# Patient Record
Sex: MALE | Race: WHITE | NOT HISPANIC OR LATINO | Employment: UNEMPLOYED | URBAN - METROPOLITAN AREA
[De-identification: names, ages, dates, MRNs, and addresses within clinical notes are randomized per-mention and may not be internally consistent; named-entity substitution may affect disease eponyms.]

---

## 2024-05-24 ENCOUNTER — OFFICE VISIT (OUTPATIENT)
Dept: LAB | Facility: HOSPITAL | Age: 39
End: 2024-05-24
Payer: COMMERCIAL

## 2024-05-24 ENCOUNTER — APPOINTMENT (OUTPATIENT)
Dept: LAB | Age: 39
End: 2024-05-24
Payer: COMMERCIAL

## 2024-05-24 DIAGNOSIS — R07.9 CHEST PAIN, UNSPECIFIED TYPE: ICD-10-CM

## 2024-05-24 DIAGNOSIS — Z00.00 ROUTINE ADULT HEALTH MAINTENANCE: ICD-10-CM

## 2024-05-24 LAB
25(OH)D3 SERPL-MCNC: 15.3 NG/ML (ref 30–100)
ALBUMIN SERPL BCP-MCNC: 4.2 G/DL (ref 3.5–5)
ALP SERPL-CCNC: 54 U/L (ref 34–104)
ALT SERPL W P-5'-P-CCNC: 14 U/L (ref 7–52)
ANION GAP SERPL CALCULATED.3IONS-SCNC: 9 MMOL/L (ref 4–13)
AST SERPL W P-5'-P-CCNC: 14 U/L (ref 13–39)
ATRIAL RATE: 59 BPM
BASOPHILS # BLD AUTO: 0.04 THOUSANDS/ÂΜL (ref 0–0.1)
BASOPHILS NFR BLD AUTO: 1 % (ref 0–1)
BILIRUB SERPL-MCNC: 1.21 MG/DL (ref 0.2–1)
BUN SERPL-MCNC: 14 MG/DL (ref 5–25)
CALCIUM SERPL-MCNC: 9.3 MG/DL (ref 8.4–10.2)
CHLORIDE SERPL-SCNC: 107 MMOL/L (ref 96–108)
CHOLEST SERPL-MCNC: 189 MG/DL
CO2 SERPL-SCNC: 26 MMOL/L (ref 21–32)
CREAT SERPL-MCNC: 0.78 MG/DL (ref 0.6–1.3)
EOSINOPHIL # BLD AUTO: 0.27 THOUSAND/ÂΜL (ref 0–0.61)
EOSINOPHIL NFR BLD AUTO: 3 % (ref 0–6)
ERYTHROCYTE [DISTWIDTH] IN BLOOD BY AUTOMATED COUNT: 13.5 % (ref 11.6–15.1)
GFR SERPL CREATININE-BSD FRML MDRD: 115 ML/MIN/1.73SQ M
GLUCOSE P FAST SERPL-MCNC: 92 MG/DL (ref 65–99)
HCT VFR BLD AUTO: 50.1 % (ref 36.5–49.3)
HDLC SERPL-MCNC: 40 MG/DL
HGB BLD-MCNC: 15.5 G/DL (ref 12–17)
IMM GRANULOCYTES # BLD AUTO: 0.03 THOUSAND/UL (ref 0–0.2)
IMM GRANULOCYTES NFR BLD AUTO: 0 % (ref 0–2)
LDLC SERPL CALC-MCNC: 129 MG/DL (ref 0–100)
LYMPHOCYTES # BLD AUTO: 4.08 THOUSANDS/ÂΜL (ref 0.6–4.47)
LYMPHOCYTES NFR BLD AUTO: 51 % (ref 14–44)
MCH RBC QN AUTO: 26.6 PG (ref 26.8–34.3)
MCHC RBC AUTO-ENTMCNC: 30.9 G/DL (ref 31.4–37.4)
MCV RBC AUTO: 86 FL (ref 82–98)
MONOCYTES # BLD AUTO: 0.49 THOUSAND/ÂΜL (ref 0.17–1.22)
MONOCYTES NFR BLD AUTO: 6 % (ref 4–12)
NEUTROPHILS # BLD AUTO: 3.13 THOUSANDS/ÂΜL (ref 1.85–7.62)
NEUTS SEG NFR BLD AUTO: 39 % (ref 43–75)
NONHDLC SERPL-MCNC: 149 MG/DL
NRBC BLD AUTO-RTO: 0 /100 WBCS
P AXIS: 27 DEGREES
PLATELET # BLD AUTO: 316 THOUSANDS/UL (ref 149–390)
PMV BLD AUTO: 10.5 FL (ref 8.9–12.7)
POTASSIUM SERPL-SCNC: 4.7 MMOL/L (ref 3.5–5.3)
PR INTERVAL: 134 MS
PROT SERPL-MCNC: 7.1 G/DL (ref 6.4–8.4)
QRS AXIS: 47 DEGREES
QRSD INTERVAL: 90 MS
QT INTERVAL: 378 MS
QTC INTERVAL: 374 MS
RBC # BLD AUTO: 5.82 MILLION/UL (ref 3.88–5.62)
SODIUM SERPL-SCNC: 142 MMOL/L (ref 135–147)
T WAVE AXIS: 39 DEGREES
TRIGL SERPL-MCNC: 102 MG/DL
VENTRICULAR RATE: 59 BPM
WBC # BLD AUTO: 8.04 THOUSAND/UL (ref 4.31–10.16)

## 2024-05-24 PROCEDURE — 80061 LIPID PANEL: CPT

## 2024-05-24 PROCEDURE — 93005 ELECTROCARDIOGRAM TRACING: CPT

## 2024-05-24 PROCEDURE — 82306 VITAMIN D 25 HYDROXY: CPT

## 2024-05-24 PROCEDURE — 85025 COMPLETE CBC W/AUTO DIFF WBC: CPT

## 2024-05-24 PROCEDURE — 36415 COLL VENOUS BLD VENIPUNCTURE: CPT

## 2024-05-24 PROCEDURE — 80053 COMPREHEN METABOLIC PANEL: CPT

## 2024-05-24 PROCEDURE — 93010 ELECTROCARDIOGRAM REPORT: CPT | Performed by: INTERNAL MEDICINE

## 2024-10-07 ENCOUNTER — HOSPITAL ENCOUNTER (EMERGENCY)
Facility: HOSPITAL | Age: 39
Discharge: HOME | End: 2024-10-07
Attending: EMERGENCY MEDICINE | Admitting: EMERGENCY MEDICINE
Payer: COMMERCIAL

## 2024-10-07 ENCOUNTER — APPOINTMENT (EMERGENCY)
Dept: RADIOLOGY | Facility: HOSPITAL | Age: 39
End: 2024-10-07
Payer: COMMERCIAL

## 2024-10-07 VITALS
DIASTOLIC BLOOD PRESSURE: 74 MMHG | OXYGEN SATURATION: 96 % | SYSTOLIC BLOOD PRESSURE: 118 MMHG | RESPIRATION RATE: 18 BRPM | TEMPERATURE: 97.9 F | HEART RATE: 61 BPM

## 2024-10-07 DIAGNOSIS — R07.89 ATYPICAL CHEST PAIN: Primary | ICD-10-CM

## 2024-10-07 DIAGNOSIS — Z72.0 TOBACCO ABUSE: ICD-10-CM

## 2024-10-07 PROCEDURE — 99285 EMERGENCY DEPT VISIT HI MDM: CPT

## 2024-10-07 PROCEDURE — 93005 ELECTROCARDIOGRAM TRACING: CPT

## 2024-10-07 PROCEDURE — 71045 X-RAY EXAM CHEST 1 VIEW: CPT

## 2024-10-07 PROCEDURE — 99284 EMERGENCY DEPT VISIT MOD MDM: CPT | Performed by: EMERGENCY MEDICINE

## 2024-10-07 NOTE — ED PROVIDER NOTES
Final diagnoses:   Atypical chest pain   Tobacco abuse     ED Disposition       ED Disposition   Discharge    Condition   Stable    Date/Time   Mon Oct 7, 2024  7:37 PM    Comment   Harjit Lemus discharge to home/self care.                   Assessment & Plan       Medical Decision Making  Amount and/or Complexity of Data Reviewed  Radiology: ordered and independent interpretation performed.      CHEST PAIN MDM  Presentation with the above-described chest pain. Differential diagnosis includes but is not  limited to ACS, pneumonia, PE, pneumothorax, pulmonary edema/CHF, aortic dissection,  pericarditis, and intra-abdominal process.      ED course: [Stable]     MDM: Please see ED course for specifics,    EKG and history do not support the diagnosis of pericarditis, change from prior tracings.    There is no evidence of pneumothorax or infiltrate on CXR.    Aortic dissection was considered, but presenting symptoms felt uncharacteristic, chest X-ray  shows no evidence of mediastinal widening and there are strong, equal and symmetric pulses.  Given the current presentation, aortic dissection is felt unlikely.*      History Chest X-ray, and exam do not suggest pulmonary edema/congestive heart failure.     Pulmonary embolism was considered but felt unlikely due to the compendium of presenting  elements leading to a low pre-test probability followed by a negative PERC rule. All 8 of the  rule-out PERC criteria were present including Age<50, HR <100, O2 sat > 94%, no recent  trauma/surgery, no hemoptysis, no exogenous hormone use, no clinical signs suggestive of  DVT, no hx DVT/PE. Given the above, there is a <2% risk of PE and I feel that no further  diagnostic testing is needed.**    Intra-abdominal pathology felt unlikely given benign/nontender abdominal exam.  Acute coronary syndrome is considered, no acute  ischemic EKG changes, and the patient has a low HEART score.     Based on this, I think that there is a low risk  for short-term major adverse cardiac event. I have discussed this with the patient and reviewed options for inpatient and outpatient management. The patient verbalizes  an excellent understanding of the above including the presence of a small risk of short-term  major adverse cardiac event even in the setting of low HEART score, and compendium of elements of this presentation. The patient wishes to pursue  further workup as an outpatient.    ED Course as of 10/07/24 2015   Mon Oct 07, 2024   1926 Patient seen evaluated, orders placed, physically examined, chest pain right sided nonradiating intermittent in nature not related to exertion no ACS like symptoms.  Pain is reproducible, currently pain-free.    Brief focused differential dx in this patient is as follows: Atypical chest pain versus musculoskeletal chest pain versus.       Medications - No data to display    ED Risk Strat Scores                       PERC Rule for PE      Flowsheet Row Most Recent Value   PERC Rule for PE    Age >=50 0 Filed at: 10/07/2024 2015   HR >=100 0 Filed at: 10/07/2024 2015   O2 Sat on room air < 95% 0 Filed at: 10/07/2024 2015   History of PE or DVT 0 Filed at: 10/07/2024 2015   Recent trauma or surgery 0 Filed at: 10/07/2024 2015   Hemoptysis 0 Filed at: 10/07/2024 2015   Exogenous estrogen 0 Filed at: 10/07/2024 2015   Unilateral leg swelling 0 Filed at: 10/07/2024 2015   PERC Rule for PE Results 0 Filed at: 10/07/2024 2015                Wells' Criteria for PE      Flowsheet Row Most Recent Value   Wells' Criteria for PE    Clinical signs and symptoms of DVT 0 Filed at: 10/07/2024 2015   PE is primary diagnosis or equally likely 0 Filed at: 10/07/2024 2015   HR >100 0 Filed at: 10/07/2024 2015   Immobilization at least 3 days or Surgery in the previous 4 weeks 0 Filed at: 10/07/2024 2015   Previous, objectively diagnosed PE or DVT 0 Filed at: 10/07/2024 2015   Hemoptysis 0 Filed at: 10/07/2024 2015   Malignancy with treatment  within 6 months or palliative 0 Filed at: 10/07/2024 2015   Wells' Criteria Total 0 Filed at: 10/07/2024 2015          Quinn' Criteria for DVT      Flowsheet Row Most Recent Value   Quinn' Criteria for DVT    Active cancer Treatment or palliation within 6 months 0 Filed at: 10/07/2024 2015   Bedridden recently >3 days or major surgery within 12 weeks 0 Filed at: 10/07/2024 2015   Calf swelling >3 cm compared to the other leg 0 Filed at: 10/07/2024 2015   Entire leg swollen 0 Filed at: 10/07/2024 2015   Collateral (nonvaricose) superficial veins present 0 Filed at: 10/07/2024 2015   Localized tenderness along the deep venous system 0 Filed at: 10/07/2024 2015   Pitting edema, confined to symptomatic leg 0 Filed at: 10/07/2024 2015   Paralysis, paresis, or recent plaster immobilization of the lower extremity 0 Filed at: 10/07/2024 2015   Previously documented DVT 0 Filed at: 10/07/2024 2015   Alternative diagnosis to DVT as likely or more likely 0 Filed at: 10/07/2024 2015   Wells DVT Critera Score 0 Filed at: 10/07/2024 2015                      History of Present Illness       Chief Complaint   Patient presents with    Chest Pain     Pt states that he would like a head to toe evaluation      Shortness of Breath       History reviewed. No pertinent past medical history.   History reviewed. No pertinent surgical history.   History reviewed. No pertinent family history.   Social History     Tobacco Use    Smoking status: Every Day     Current packs/day: 1.00     Types: Cigarettes    Smokeless tobacco: Current   Vaping Use    Vaping status: Never Used   Substance Use Topics    Alcohol use: No    Drug use: No      E-Cigarette/Vaping    E-Cigarette Use Never User       E-Cigarette/Vaping Substances    Nicotine No     THC No     CBD No     Flavoring No     Other No     Unknown No       I have reviewed and agree with the history as documented.     HPI      39-year-old gentleman presents emergency department with chief  complaint of intermittent nonradiating right-sided chest pain without associated nausea, diaphoresis, shortness of breath, exertional symptoms.  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  Does smoke cigarettes but does not consume alcohol on a regular basis no history of illicit drug use.    Abdominal pain, no nausea, no vomiting, no diaphoresis, no presyncopal symptoms, no jaw pain, no upper back pain no calf pain no recent travel outside the geographical area.  No history of hemoptysis, melena, hematochezia, remaining remaining 12 point review of systems is unremarkable.  Review of Systems   Constitutional: Negative.    HENT: Negative.     Eyes: Negative.    Respiratory:  Negative for chest tightness and shortness of breath.    Cardiovascular:  Positive for chest pain. Negative for palpitations and leg swelling.   Gastrointestinal: Negative.  Negative for abdominal pain, diarrhea and vomiting.   Endocrine: Negative.    Genitourinary: Negative.    Musculoskeletal: Negative.    Skin: Negative.    Allergic/Immunologic: Negative.    Neurological: Negative.    Hematological: Negative.    Psychiatric/Behavioral: Negative.             Objective       ED Triage Vitals   Temperature Pulse Blood Pressure Respirations SpO2 Patient Position - Orthostatic VS   10/07/24 1915 10/07/24 1915 10/07/24 1915 10/07/24 1915 10/07/24 1915 10/07/24 2000   97.9 °F (36.6 °C) 62 120/59 19 98 % Sitting      Temp src Heart Rate Source BP Location FiO2 (%) Pain Score    -- 10/07/24 1915 10/07/24 2000 -- 10/07/24 1915     Monitor Left arm  3      Vitals      Date and Time Temp Pulse SpO2 Resp BP Pain Score FACES Pain Rating User   10/07/24 2000 -- 61 96 % 18 118/74 -- -- JR   10/07/24 1915 97.9 °F (36.6 °C) 62 98 % 19 120/59 3 -- AF            Physical Exam  Vitals and nursing note  reviewed.   Constitutional:       General: He is not in acute distress.     Appearance: He is well-developed and normal weight. He is not ill-appearing, toxic-appearing or diaphoretic.   HENT:      Head: Normocephalic and atraumatic.   Eyes:      Extraocular Movements: Extraocular movements intact.      Pupils: Pupils are equal, round, and reactive to light.   Cardiovascular:      Rate and Rhythm: Normal rate and regular rhythm.      Heart sounds: Normal heart sounds.   Pulmonary:      Effort: Pulmonary effort is normal. No tachypnea, accessory muscle usage or respiratory distress.      Breath sounds: Normal breath sounds. No stridor. No decreased breath sounds, wheezing, rhonchi or rales.   Abdominal:      General: Bowel sounds are normal. There is no abdominal bruit.      Palpations: There is no fluid wave.   Musculoskeletal:         General: Normal range of motion.      Cervical back: Normal range of motion and neck supple.      Right lower leg: No tenderness. No edema.      Left lower leg: No tenderness. No edema.   Skin:     General: Skin is warm.      Capillary Refill: Capillary refill takes less than 2 seconds.   Neurological:      General: No focal deficit present.      Mental Status: He is alert and oriented to person, place, and time.   Psychiatric:         Mood and Affect: Mood normal.         Behavior: Behavior normal.         Results Reviewed       None            XR chest 1 view portable   ED Interpretation by Thanh Hopkins III, DO (10/07 1937)   Portable chest x-ray shows no acute fractures, osseous abnormalities or occult pneumothoraces.          ECG 12 Lead Documentation Only    Date/Time: 10/7/2024 7:50 PM    Performed by: Thanh Hopkins III, DO  Authorized by: Thanh Hopkins III, DO    Indications / Diagnosis:  Chest pain  ECG reviewed by me, the ED Provider: yes    Patient location:  ED  Comments:      I personally reviewed this EKG that was performed with the patient  October 7, 2024, EKG was completed at 7:47 PM, interpreted by me at 7:50 PM, normal sinus rhythm with no acute ST abnormalities, ventricular rate of 65 bpm.  Unchanged from prior tracings as of May 24, 2024.    No diffuse elevations to indicate pericarditis.  No coved ST elevations greater than 2mm with negative T waves in V1-3 to indicate concern for brugada.  No biphasic T waves in V2, V3 to indicate Wellens (critical stenosis of LAD).   No elevation in aVR or deviation when compared to V1 (can be associated with ST depression in I,II, V4-6 when left main occlusion is present).       ED Medication and Procedure Management   Prior to Admission Medications   Prescriptions Last Dose Informant Patient Reported? Taking?   ergocalciferol (VITAMIN D2) 50,000 units   No No   Sig: Take 1 capsule (50,000 Units total) by mouth once a week   ibuprofen (MOTRIN) 400 mg tablet   No No   Sig: Take 1 tablet (400 mg total) by mouth 3 (three) times a day with meals for 10 days      Facility-Administered Medications: None     Patient's Medications   Discharge Prescriptions    No medications on file     No discharge procedures on file.  ED SEPSIS DOCUMENTATION   Time reflects when diagnosis was documented in both MDM as applicable and the Disposition within this note       Time User Action Codes Description Comment    10/7/2024  8:01 PM Thanh Hopkins [R07.89] Atypical chest pain     10/7/2024  8:15 PM Thanh Hopkins [Z72.0] Tobacco abuse                  Thanh Hopkins III, DO  10/07/24 2016

## 2024-10-07 NOTE — Clinical Note
Harjit Lemus was seen and treated in our emergency department on 10/7/2024.    No restrictions            Diagnosis:     Harjit  may return to work on return date.    He may return on this date: 10/09/2024         If you have any questions or concerns, please don't hesitate to call.      Daylin Harp RN    ______________________________           _______________          _______________  Hospital Representative                              Date                                Time

## 2024-10-08 LAB
ATRIAL RATE: 65 BPM
P AXIS: 50 DEGREES
PR INTERVAL: 150 MS
QRS AXIS: 70 DEGREES
QRSD INTERVAL: 92 MS
QT INTERVAL: 394 MS
QTC INTERVAL: 409 MS
T WAVE AXIS: 64 DEGREES
VENTRICULAR RATE: 65 BPM

## 2024-10-08 PROCEDURE — 93010 ELECTROCARDIOGRAM REPORT: CPT | Performed by: INTERNAL MEDICINE

## 2025-06-04 ENCOUNTER — TELEPHONE (OUTPATIENT)
Age: 40
End: 2025-06-04

## 2025-06-05 NOTE — TELEPHONE ENCOUNTER
NP for Phimosis. Scheduled for 6/6/25    Complaints he can't pull his foreskin back, at times dysuria but denies pain.

## 2025-06-06 ENCOUNTER — OFFICE VISIT (OUTPATIENT)
Dept: UROLOGY | Facility: MEDICAL CENTER | Age: 40
End: 2025-06-06
Payer: COMMERCIAL

## 2025-06-06 ENCOUNTER — PREP FOR PROCEDURE (OUTPATIENT)
Dept: UROLOGY | Facility: MEDICAL CENTER | Age: 40
End: 2025-06-06

## 2025-06-06 VITALS
OXYGEN SATURATION: 98 % | SYSTOLIC BLOOD PRESSURE: 120 MMHG | DIASTOLIC BLOOD PRESSURE: 82 MMHG | HEART RATE: 78 BPM | BODY MASS INDEX: 32.58 KG/M2 | WEIGHT: 215 LBS | HEIGHT: 68 IN

## 2025-06-06 DIAGNOSIS — R39.89 SUSPECTED UTI: ICD-10-CM

## 2025-06-06 DIAGNOSIS — N47.1 PHIMOSIS: Primary | ICD-10-CM

## 2025-06-06 DIAGNOSIS — Z01.812 PRE-OPERATIVE LABORATORY EXAMINATION: ICD-10-CM

## 2025-06-06 PROCEDURE — 99204 OFFICE O/P NEW MOD 45 MIN: CPT

## 2025-06-06 NOTE — PROGRESS NOTES
Name: Harjit Lemus      : 1985      MRN: [id number]  Encounter Provider: SHANE Alejandro  Encounter Date: 2025   [facility]  :  Assessment & Plan  Phimosis  On exam able to retract and replace foreskin with minimal  difficulty.  No evidence of paraphimosis, ulceration or discharge. No sign of balanitis.  Discussed with patient good proper hygiene.  Discussed with patient if he is ever unable to pull foreskin back that he would need to go to the emergency room.  Patient demonstrates understanding.  Also spoke with patient's cousin Mateus on phone as he will be the  to and from the hospital the day of procedure.  Consent signed for circumcision surgery. Discussed risks of surgery. Reached out to surgery scheduler.   Unable to provide urine sample today. Urine culture ordered prior to procedure       Orders:    POCT urine dip auto non-scope    Case request operating room: CIRCUMCISION ADULT; Standing    Urine culture        History of Present Illness   Harjit Lemus is a 39 y.o. male who presents as a new patient.  Patient complaining of unable to pull foreskin back at times.  Patient states he has been struggling with retracting foreskin for years.  Patient would like to have circumcision procedure.  Patient denies dysuria, frequency, urgency, flank pain.  Back foreskin and states is never got stuck but is also complained dysuria but denies pain.  States    Review of Systems   Constitutional:  Negative for chills and fever.   HENT:  Negative for ear pain and sore throat.    Eyes:  Negative for pain and visual disturbance.   Respiratory:  Negative for cough and shortness of breath.    Cardiovascular:  Negative for chest pain and palpitations.   Gastrointestinal:  Negative for abdominal pain and vomiting.   Genitourinary:  Negative for dysuria and hematuria.   Musculoskeletal:  Negative for arthralgias and back pain.   Skin:  Negative for  "color change and rash.   Neurological:  Negative for seizures and syncope.   All other systems reviewed and are negative.         Objective   There were no vitals taken for this visit.    Physical Exam  Genitourinary:     Penis: Uncircumcised. Phimosis present. No erythema, tenderness, discharge, swelling or lesions.       Testes: Normal.           Results   No results found for: \"PSA\"  Lab Results   Component Value Date    CALCIUM 9.3 05/24/2024    K 4.7 05/24/2024    CO2 26 05/24/2024     05/24/2024    BUN 14 05/24/2024    CREATININE 0.78 05/24/2024     Lab Results   Component Value Date    WBC 8.04 05/24/2024    HGB 15.5 05/24/2024    HCT 50.1 (H) 05/24/2024    MCV 86 05/24/2024     05/24/2024       Office Urine Dip  No results found for this or any previous visit (from the past hour).      "

## 2025-06-06 NOTE — H&P (VIEW-ONLY)
Name: Harjit Lemus      : 1985      MRN: [id number]  Encounter Provider: SHANE Alejandro  Encounter Date: 2025   [facility]  :  Assessment & Plan  Phimosis  On exam able to retract and replace foreskin with minimal  difficulty.  No evidence of paraphimosis, ulceration or discharge. No sign of balanitis.  Discussed with patient good proper hygiene.  Discussed with patient if he is ever unable to pull foreskin back that he would need to go to the emergency room.  Patient demonstrates understanding.  Also spoke with patient's cousin Mateus on phone as he will be the  to and from the hospital the day of procedure.  Consent signed for circumcision surgery. Discussed risks of surgery. Reached out to surgery scheduler.   Unable to provide urine sample today. Urine culture ordered prior to procedure       Orders:    POCT urine dip auto non-scope    Case request operating room: CIRCUMCISION ADULT; Standing    Urine culture        History of Present Illness   Harjit Lemus is a 39 y.o. male who presents as a new patient.  Patient complaining of unable to pull foreskin back at times.  Patient states he has been struggling with retracting foreskin for years.  Patient would like to have circumcision procedure.  Patient denies dysuria, frequency, urgency, flank pain.  Back foreskin and states is never got stuck but is also complained dysuria but denies pain.  States    Review of Systems   Constitutional:  Negative for chills and fever.   HENT:  Negative for ear pain and sore throat.    Eyes:  Negative for pain and visual disturbance.   Respiratory:  Negative for cough and shortness of breath.    Cardiovascular:  Negative for chest pain and palpitations.   Gastrointestinal:  Negative for abdominal pain and vomiting.   Genitourinary:  Negative for dysuria and hematuria.   Musculoskeletal:  Negative for arthralgias and back pain.   Skin:  Negative for  "color change and rash.   Neurological:  Negative for seizures and syncope.   All other systems reviewed and are negative.         Objective   There were no vitals taken for this visit.    Physical Exam  Genitourinary:     Penis: Uncircumcised. Phimosis present. No erythema, tenderness, discharge, swelling or lesions.       Testes: Normal.           Results   No results found for: \"PSA\"  Lab Results   Component Value Date    CALCIUM 9.3 05/24/2024    K 4.7 05/24/2024    CO2 26 05/24/2024     05/24/2024    BUN 14 05/24/2024    CREATININE 0.78 05/24/2024     Lab Results   Component Value Date    WBC 8.04 05/24/2024    HGB 15.5 05/24/2024    HCT 50.1 (H) 05/24/2024    MCV 86 05/24/2024     05/24/2024       Office Urine Dip  No results found for this or any previous visit (from the past hour).      "

## 2025-06-10 NOTE — TELEPHONE ENCOUNTER
Called this patient to schedule his surgery.  We had agreed on 7/3 as a surgery date, but then this patient asked me to call his cousin Mateus, who is listed as an emergency contact for him, as he stated that he doesn't speak/understand English very well.  I did call Mateus's phone and left a voice message asking him to return my phone call

## 2025-06-12 NOTE — TELEPHONE ENCOUNTER
Spoke with patient and confirmed surgery date of 7/3/25  Type of surgery: Circumcision  Operating physician:Dr. Alcantar  Location of surgery: [facility]    Verbally went over prep with patient on 6/10/25  NPO  Bowel prep? No  Hospital calls afternoon prior with arrival time (calls Friday afternoon for Monday surgery)  Patient needs ride to and from surgery   outpatient  Pre-op testing to be done 2 weeks prior to surgery. All testing can be done as a walk-in. EKG can only be done as a walk-in at any [facility].  UCX  Blood thinners:   None  Clearances needed: None    Emailed to patient on 6/12/25  Copy of packet scanned into Media  Labs in packet and in electronic record   Soap prep in packet  post-op in packet     Consent: in media

## 2025-06-12 NOTE — TELEPHONE ENCOUNTER
Spoke to patient's cousin Mateus, went over surgery instructions and explained that I had emailed the surgery information to this patient earlier today.

## 2025-06-18 ENCOUNTER — APPOINTMENT (OUTPATIENT)
Dept: LAB | Facility: HOSPITAL | Age: 40
End: 2025-06-18
Payer: MEDICARE

## 2025-06-18 DIAGNOSIS — R39.89 SUSPECTED UTI: ICD-10-CM

## 2025-06-18 DIAGNOSIS — Z00.00 ROUTINE GENERAL MEDICAL EXAMINATION AT A HEALTH CARE FACILITY: ICD-10-CM

## 2025-06-18 DIAGNOSIS — R41.3 MEMORY CHANGES: ICD-10-CM

## 2025-06-18 DIAGNOSIS — Z00.00 ROUTINE ADULT HEALTH MAINTENANCE: ICD-10-CM

## 2025-06-18 DIAGNOSIS — N47.1 PHIMOSIS: ICD-10-CM

## 2025-06-18 DIAGNOSIS — R51.9 DAILY HEADACHE: ICD-10-CM

## 2025-06-18 DIAGNOSIS — Z01.812 PRE-OPERATIVE LABORATORY EXAMINATION: ICD-10-CM

## 2025-06-18 LAB
25(OH)D3 SERPL-MCNC: 19.3 NG/ML (ref 30–100)
ALBUMIN SERPL BCG-MCNC: 4.3 G/DL (ref 3.5–5)
ALP SERPL-CCNC: 49 U/L (ref 34–104)
ALT SERPL W P-5'-P-CCNC: 14 U/L (ref 7–52)
ANION GAP SERPL CALCULATED.3IONS-SCNC: 4 MMOL/L (ref 4–13)
AST SERPL W P-5'-P-CCNC: 9 U/L (ref 13–39)
BASOPHILS # BLD AUTO: 0.03 THOUSANDS/ÂΜL (ref 0–0.1)
BASOPHILS NFR BLD AUTO: 0 % (ref 0–1)
BILIRUB SERPL-MCNC: 1.15 MG/DL (ref 0.2–1)
BUN SERPL-MCNC: 16 MG/DL (ref 5–25)
CALCIUM SERPL-MCNC: 9.1 MG/DL (ref 8.4–10.2)
CHLORIDE SERPL-SCNC: 106 MMOL/L (ref 96–108)
CHOLEST SERPL-MCNC: 192 MG/DL (ref ?–200)
CO2 SERPL-SCNC: 29 MMOL/L (ref 21–32)
CREAT SERPL-MCNC: 0.97 MG/DL (ref 0.6–1.3)
EOSINOPHIL # BLD AUTO: 0.29 THOUSAND/ÂΜL (ref 0–0.61)
EOSINOPHIL NFR BLD AUTO: 4 % (ref 0–6)
ERYTHROCYTE [DISTWIDTH] IN BLOOD BY AUTOMATED COUNT: 13 % (ref 11.6–15.1)
GFR SERPL CREATININE-BSD FRML MDRD: 98 ML/MIN/1.73SQ M
GLUCOSE P FAST SERPL-MCNC: 90 MG/DL (ref 65–99)
HCT VFR BLD AUTO: 49.2 % (ref 36.5–49.3)
HDLC SERPL-MCNC: 36 MG/DL
HGB BLD-MCNC: 15.1 G/DL (ref 12–17)
IMM GRANULOCYTES # BLD AUTO: 0.02 THOUSAND/UL (ref 0–0.2)
IMM GRANULOCYTES NFR BLD AUTO: 0 % (ref 0–2)
LDLC SERPL CALC-MCNC: 134 MG/DL (ref 0–100)
LYMPHOCYTES # BLD AUTO: 2.97 THOUSANDS/ÂΜL (ref 0.6–4.47)
LYMPHOCYTES NFR BLD AUTO: 44 % (ref 14–44)
MCH RBC QN AUTO: 26.9 PG (ref 26.8–34.3)
MCHC RBC AUTO-ENTMCNC: 30.7 G/DL (ref 31.4–37.4)
MCV RBC AUTO: 88 FL (ref 82–98)
MONOCYTES # BLD AUTO: 0.39 THOUSAND/ÂΜL (ref 0.17–1.22)
MONOCYTES NFR BLD AUTO: 6 % (ref 4–12)
NEUTROPHILS # BLD AUTO: 2.99 THOUSANDS/ÂΜL (ref 1.85–7.62)
NEUTS SEG NFR BLD AUTO: 46 % (ref 43–75)
NONHDLC SERPL-MCNC: 156 MG/DL
NRBC BLD AUTO-RTO: 0 /100 WBCS
PLATELET # BLD AUTO: 295 THOUSANDS/UL (ref 149–390)
PMV BLD AUTO: 10.3 FL (ref 8.9–12.7)
POTASSIUM SERPL-SCNC: 4.4 MMOL/L (ref 3.5–5.3)
PROT SERPL-MCNC: 7.2 G/DL (ref 6.4–8.4)
RBC # BLD AUTO: 5.61 MILLION/UL (ref 3.88–5.62)
SODIUM SERPL-SCNC: 139 MMOL/L (ref 135–147)
TRIGL SERPL-MCNC: 108 MG/DL (ref ?–150)
TSH SERPL DL<=0.05 MIU/L-ACNC: 2.03 UIU/ML (ref 0.45–4.5)
WBC # BLD AUTO: 6.69 THOUSAND/UL (ref 4.31–10.16)

## 2025-06-18 PROCEDURE — 80053 COMPREHEN METABOLIC PANEL: CPT

## 2025-06-18 PROCEDURE — 80061 LIPID PANEL: CPT

## 2025-06-18 PROCEDURE — 85025 COMPLETE CBC W/AUTO DIFF WBC: CPT

## 2025-06-18 PROCEDURE — 84443 ASSAY THYROID STIM HORMONE: CPT

## 2025-06-18 PROCEDURE — 36415 COLL VENOUS BLD VENIPUNCTURE: CPT

## 2025-06-18 PROCEDURE — 82306 VITAMIN D 25 HYDROXY: CPT

## 2025-06-20 LAB — BACTERIA UR CULT: NORMAL

## 2025-06-25 NOTE — PRE-PROCEDURE INSTRUCTIONS
No outpatient medications have been marked as taking for the 7/3/25 encounter (Hospital Encounter).    Pt reports no prescription medications or OTC vitamins/supplements or herbals being taken at time of the PAT call.    Pt with slight language barrier /needed much repetition with understanding instructions.    Medication instructions for day of surgery reviewed. Please take all instructed medications with only a sip of water. Please do not take any over the counter (non-prescribed) vitamins or supplements for one week prior to date of surgery.      You will receive a call one business day prior to surgery with an arrival time and hospital directions. If your surgery is scheduled on a Monday, the hospital will be calling you on the Friday prior to your surgery. If you have not heard from anyone by 8pm, please call the hospital supervisor through the hospital  at [phone]. ([place] [phone] or [place] [phone]).    Do not eat or drink anything after midnight the night before your surgery, including candy, mints, lifesavers, or chewing gum. Do not drink alcohol 24hrs before your surgery. Try not to smoke at least 24hrs before your surgery.       Follow the pre surgery showering instructions as listed in the “My Surgical Experience Booklet” or otherwise provided by your surgeon's office. Do not use a blade to shave the surgical area 1 week before surgery. It is okay to use a clean electric clippers up to 24 hours before surgery. Do not apply any lotions, creams, including makeup, cologne, deodorant, or perfumes after showering on the day of your surgery. Do not use dry shampoo, hair spray, hair gel, or any type of hair products.     No contact lenses, eye make-up, or artificial eyelashes. Remove nail polish, including gel polish, and any artificial, gel, or acrylic nails if possible. Remove all jewelry including rings and body piercing jewelry.     Wear causal clothing that is easy to take on and  off. Consider your type of surgery.    Keep any valuables, jewelry, piercings at home. Please bring any specially ordered equipment (sling, braces) if indicated.    Arrange for a responsible person to drive you to and from the hospital on the day of your surgery. Please confirm the visitor policy for the day of your procedure when you receive your phone call with an arrival time.     Call the surgeon's office with any new illnesses, exposures, or additional questions prior to surgery.    Please reference your “My Surgical Experience Booklet” for additional information to prepare for your upcoming surgery.

## 2025-07-01 ENCOUNTER — ANESTHESIA EVENT (OUTPATIENT)
Dept: PERIOP | Facility: HOSPITAL | Age: 40
End: 2025-07-01
Payer: MEDICARE

## 2025-07-03 ENCOUNTER — HOSPITAL ENCOUNTER (OUTPATIENT)
Facility: HOSPITAL | Age: 40
Setting detail: OUTPATIENT SURGERY
Discharge: HOME | End: 2025-07-03
Attending: UROLOGY | Admitting: UROLOGY
Payer: MEDICARE

## 2025-07-03 ENCOUNTER — ANESTHESIA (OUTPATIENT)
Dept: PERIOP | Facility: HOSPITAL | Age: 40
End: 2025-07-03
Payer: MEDICARE

## 2025-07-03 VITALS
HEIGHT: 70 IN | HEART RATE: 80 BPM | BODY MASS INDEX: 30.77 KG/M2 | RESPIRATION RATE: 16 BRPM | TEMPERATURE: 97.5 F | SYSTOLIC BLOOD PRESSURE: 114 MMHG | OXYGEN SATURATION: 97 % | WEIGHT: 214.95 LBS | DIASTOLIC BLOOD PRESSURE: 66 MMHG

## 2025-07-03 DIAGNOSIS — N47.1 PHIMOSIS OF PENIS: Primary | ICD-10-CM

## 2025-07-03 DIAGNOSIS — N47.1 PHIMOSIS: ICD-10-CM

## 2025-07-03 PROBLEM — F17.200 SMOKER: Status: ACTIVE | Noted: 2025-07-03

## 2025-07-03 PROBLEM — E66.9 OBESITY (BMI 30-39.9): Status: ACTIVE | Noted: 2025-07-03

## 2025-07-03 PROCEDURE — 54161 CIRCUM 28 DAYS OR OLDER: CPT | Performed by: UROLOGY

## 2025-07-03 PROCEDURE — 88304 TISSUE EXAM BY PATHOLOGIST: CPT | Performed by: STUDENT IN AN ORGANIZED HEALTH CARE EDUCATION/TRAINING PROGRAM

## 2025-07-03 RX ORDER — GINSENG 100 MG
1 CAPSULE ORAL 2 TIMES DAILY
Qty: 28 G | Refills: 1 | Status: SHIPPED | OUTPATIENT
Start: 2025-07-05

## 2025-07-03 RX ORDER — LIDOCAINE HCL/PF 100 MG/5ML
SYRINGE (ML) INJECTION AS NEEDED
Status: DISCONTINUED | OUTPATIENT
Start: 2025-07-03 | End: 2025-07-03

## 2025-07-03 RX ORDER — SODIUM CHLORIDE, SODIUM LACTATE, POTASSIUM CHLORIDE, CALCIUM CHLORIDE 600; 310; 30; 20 MG/100ML; MG/100ML; MG/100ML; MG/100ML
125 INJECTION, SOLUTION INTRAVENOUS CONTINUOUS
Status: DISCONTINUED | OUTPATIENT
Start: 2025-07-03 | End: 2025-07-03 | Stop reason: HOSPADM

## 2025-07-03 RX ORDER — ONDANSETRON 2 MG/ML
INJECTION INTRAMUSCULAR; INTRAVENOUS AS NEEDED
Status: DISCONTINUED | OUTPATIENT
Start: 2025-07-03 | End: 2025-07-03

## 2025-07-03 RX ORDER — SODIUM CHLORIDE, SODIUM LACTATE, POTASSIUM CHLORIDE, CALCIUM CHLORIDE 600; 310; 30; 20 MG/100ML; MG/100ML; MG/100ML; MG/100ML
INJECTION, SOLUTION INTRAVENOUS CONTINUOUS PRN
Status: DISCONTINUED | OUTPATIENT
Start: 2025-07-03 | End: 2025-07-03

## 2025-07-03 RX ORDER — EPHEDRINE SULFATE 50 MG/ML
INJECTION INTRAVENOUS AS NEEDED
Status: DISCONTINUED | OUTPATIENT
Start: 2025-07-03 | End: 2025-07-03

## 2025-07-03 RX ORDER — CEFAZOLIN SODIUM 2 G/50ML
2000 SOLUTION INTRAVENOUS ONCE
Status: COMPLETED | OUTPATIENT
Start: 2025-07-03 | End: 2025-07-03

## 2025-07-03 RX ORDER — OXYCODONE HYDROCHLORIDE 5 MG/1
5 TABLET ORAL EVERY 4 HOURS PRN
Refills: 0 | Status: DISCONTINUED | OUTPATIENT
Start: 2025-07-03 | End: 2025-07-03 | Stop reason: HOSPADM

## 2025-07-03 RX ORDER — FENTANYL CITRATE/PF 50 MCG/ML
25 SYRINGE (ML) INJECTION
Status: DISCONTINUED | OUTPATIENT
Start: 2025-07-03 | End: 2025-07-03 | Stop reason: HOSPADM

## 2025-07-03 RX ORDER — MIDAZOLAM HYDROCHLORIDE 2 MG/2ML
INJECTION, SOLUTION INTRAMUSCULAR; INTRAVENOUS AS NEEDED
Status: DISCONTINUED | OUTPATIENT
Start: 2025-07-03 | End: 2025-07-03

## 2025-07-03 RX ORDER — KETOROLAC TROMETHAMINE 30 MG/ML
15 INJECTION, SOLUTION INTRAMUSCULAR; INTRAVENOUS EVERY 6 HOURS PRN
Status: DISCONTINUED | OUTPATIENT
Start: 2025-07-03 | End: 2025-07-03 | Stop reason: HOSPADM

## 2025-07-03 RX ORDER — MAGNESIUM HYDROXIDE 1200 MG/15ML
LIQUID ORAL AS NEEDED
Status: DISCONTINUED | OUTPATIENT
Start: 2025-07-03 | End: 2025-07-03 | Stop reason: HOSPADM

## 2025-07-03 RX ORDER — KETOROLAC TROMETHAMINE 10 MG/1
10 TABLET, FILM COATED ORAL EVERY 6 HOURS PRN
Qty: 20 TABLET | Refills: 0 | Status: SHIPPED | OUTPATIENT
Start: 2025-07-03 | End: 2025-07-08

## 2025-07-03 RX ORDER — DEXAMETHASONE SODIUM PHOSPHATE 10 MG/ML
INJECTION, SOLUTION INTRAMUSCULAR; INTRAVENOUS AS NEEDED
Status: DISCONTINUED | OUTPATIENT
Start: 2025-07-03 | End: 2025-07-03

## 2025-07-03 RX ORDER — MEPERIDINE HYDROCHLORIDE 25 MG/ML
12.5 INJECTION INTRAMUSCULAR; INTRAVENOUS; SUBCUTANEOUS
Status: DISCONTINUED | OUTPATIENT
Start: 2025-07-03 | End: 2025-07-03 | Stop reason: HOSPADM

## 2025-07-03 RX ORDER — KETOROLAC TROMETHAMINE 30 MG/ML
INJECTION, SOLUTION INTRAMUSCULAR; INTRAVENOUS AS NEEDED
Status: DISCONTINUED | OUTPATIENT
Start: 2025-07-03 | End: 2025-07-03

## 2025-07-03 RX ORDER — PHENYLEPHRINE HCL IN 0.9% NACL 1 MG/10 ML
SYRINGE (ML) INTRAVENOUS AS NEEDED
Status: DISCONTINUED | OUTPATIENT
Start: 2025-07-03 | End: 2025-07-03

## 2025-07-03 RX ORDER — ONDANSETRON 2 MG/ML
4 INJECTION INTRAMUSCULAR; INTRAVENOUS EVERY 6 HOURS PRN
Status: DISCONTINUED | OUTPATIENT
Start: 2025-07-03 | End: 2025-07-03 | Stop reason: HOSPADM

## 2025-07-03 RX ORDER — FENTANYL CITRATE 50 UG/ML
INJECTION, SOLUTION INTRAMUSCULAR; INTRAVENOUS AS NEEDED
Status: DISCONTINUED | OUTPATIENT
Start: 2025-07-03 | End: 2025-07-03

## 2025-07-03 RX ORDER — ONDANSETRON 2 MG/ML
4 INJECTION INTRAMUSCULAR; INTRAVENOUS ONCE AS NEEDED
Status: DISCONTINUED | OUTPATIENT
Start: 2025-07-03 | End: 2025-07-03 | Stop reason: HOSPADM

## 2025-07-03 RX ORDER — PROPOFOL 10 MG/ML
INJECTION, EMULSION INTRAVENOUS AS NEEDED
Status: DISCONTINUED | OUTPATIENT
Start: 2025-07-03 | End: 2025-07-03

## 2025-07-03 RX ORDER — HYDROMORPHONE HCL/PF 1 MG/ML
0.5 SYRINGE (ML) INJECTION
Status: DISCONTINUED | OUTPATIENT
Start: 2025-07-03 | End: 2025-07-03 | Stop reason: HOSPADM

## 2025-07-03 RX ORDER — ACETAMINOPHEN 325 MG/1
975 TABLET ORAL ONCE
Status: DISCONTINUED | OUTPATIENT
Start: 2025-07-03 | End: 2025-07-03 | Stop reason: HOSPADM

## 2025-07-03 RX ORDER — GLYCOPYRROLATE 0.2 MG/ML
INJECTION INTRAMUSCULAR; INTRAVENOUS AS NEEDED
Status: DISCONTINUED | OUTPATIENT
Start: 2025-07-03 | End: 2025-07-03

## 2025-07-03 RX ADMIN — Medication 100 MCG: at 14:43

## 2025-07-03 RX ADMIN — EPHEDRINE SULFATE 10 MG: 50 INJECTION INTRAVENOUS at 14:46

## 2025-07-03 RX ADMIN — MIDAZOLAM HYDROCHLORIDE 2 MG: 1 INJECTION, SOLUTION INTRAMUSCULAR; INTRAVENOUS at 13:36

## 2025-07-03 RX ADMIN — SODIUM CHLORIDE, SODIUM LACTATE, POTASSIUM CHLORIDE, AND CALCIUM CHLORIDE: .6; .31; .03; .02 INJECTION, SOLUTION INTRAVENOUS at 14:04

## 2025-07-03 RX ADMIN — DEXAMETHASONE SODIUM PHOSPHATE 10 MG: 10 INJECTION, SOLUTION INTRAMUSCULAR; INTRAVENOUS at 13:42

## 2025-07-03 RX ADMIN — Medication 100 MCG: at 14:35

## 2025-07-03 RX ADMIN — CEFAZOLIN SODIUM 2000 MG: 2 SOLUTION INTRAVENOUS at 13:36

## 2025-07-03 RX ADMIN — Medication 100 MCG: at 14:29

## 2025-07-03 RX ADMIN — GLYCOPYRROLATE 0.1 MG: 0.2 INJECTION, SOLUTION INTRAMUSCULAR; INTRAVENOUS at 14:20

## 2025-07-03 RX ADMIN — FENTANYL CITRATE 25 MCG: 50 INJECTION INTRAMUSCULAR; INTRAVENOUS at 13:45

## 2025-07-03 RX ADMIN — SODIUM CHLORIDE, SODIUM LACTATE, POTASSIUM CHLORIDE, AND CALCIUM CHLORIDE 125 ML/HR: .6; .31; .03; .02 INJECTION, SOLUTION INTRAVENOUS at 11:25

## 2025-07-03 RX ADMIN — Medication 100 MCG: at 14:40

## 2025-07-03 RX ADMIN — LIDOCAINE HYDROCHLORIDE 100 MG: 20 INJECTION INTRAVENOUS at 13:41

## 2025-07-03 RX ADMIN — PROPOFOL 100 MCG/KG/MIN: 10 INJECTION, EMULSION INTRAVENOUS at 13:43

## 2025-07-03 RX ADMIN — FENTANYL CITRATE 25 MCG: 50 INJECTION INTRAMUSCULAR; INTRAVENOUS at 13:41

## 2025-07-03 RX ADMIN — ONDANSETRON 4 MG: 2 INJECTION INTRAMUSCULAR; INTRAVENOUS at 13:36

## 2025-07-03 RX ADMIN — KETOROLAC TROMETHAMINE 30 MG: 30 INJECTION, SOLUTION INTRAMUSCULAR; INTRAVENOUS at 14:53

## 2025-07-03 RX ADMIN — SODIUM CHLORIDE, SODIUM LACTATE, POTASSIUM CHLORIDE, AND CALCIUM CHLORIDE: .6; .31; .03; .02 INJECTION, SOLUTION INTRAVENOUS at 13:36

## 2025-07-03 RX ADMIN — PROPOFOL 200 MG: 10 INJECTION, EMULSION INTRAVENOUS at 13:40

## 2025-07-03 RX ADMIN — FENTANYL CITRATE 50 MCG: 50 INJECTION INTRAMUSCULAR; INTRAVENOUS at 14:22

## 2025-07-03 NOTE — ANESTHESIA PREPROCEDURE EVALUATION
Procedure:  CIRCUMCISION ADULT (Penis)    Relevant Problems   Behavioral Health   (+) Smoker      Other   (+) Obesity (BMI 30-39.9)        Physical Exam    Airway     Mallampati score: II  TM Distance: <3 FB    Mouth opening: < 4 cm      Cardiovascular  Rhythm: regular, Rate: normal    Dental       Pulmonary   Breath sounds clear to auscultation    Neurological    He appears awake, alert and oriented x3.      Other Findings        Anesthesia Plan  ASA Score- 2     Anesthesia Type- general with ASA Monitors.         Additional Monitors:     Airway Plan: LMA and LMA.           Plan Factors-Exercise tolerance (METS): >4 METS.    Chart reviewed.   Existing labs reviewed.     Patient is a current smoker.  Patient instructed to abstain from smoking on day of procedure. Patient smoked on day of surgery (3 cigarettes pre-arrival).    Obstructive sleep apnea risk education given perioperatively.        Induction- intravenous.    Postoperative Plan- Plan for postoperative opioid use.   Monitoring Plan - Monitoring plan - standard ASA monitoring  Post Operative Pain Plan - non-opiod analgesics, plan for postoperative opioid use and multimodal analgesia        Informed Consent- Anesthetic plan and risks discussed with patient.        NPO Status:  Vitals Value Taken Time   Date of last liquid 07/03/25 07/03/25 11:10   Time of last liquid 0900 07/03/25 11:10   Date of last solid 07/03/25 07/03/25 11:10   Time of last solid 2300 07/03/25 11:10

## 2025-07-03 NOTE — DISCHARGE INSTR - AVS FIRST PAGE
Rest and drink plenty of fluids.  Take 2 Tylenol every 6 hours as baseline pain medication.  You can take the Toradol prescribed for more severe discomfort.  Once you no longer need Toradol you can take ibuprofen along with the Tylenol.  Do not take Toradol and Motrin together as they are similar medications.  No heavy lifting x 1 week.  No sexual activity x 6 weeks.  If the dressing causes difficulty urinating you can remove it earlier.  Expect some oozing as well as swelling and black and blue of the penis.  Remove the dressing on Saturday morning.  You can soak the dressing to get it wet if it is difficult to remove.  Once the dressing has been removed apply bacitracin to the suture line twice daily to keep it soft and aid in the sutures dissolving.  Call the office for fever, heavy bleeding, or signs of infection.

## 2025-07-03 NOTE — ANESTHESIA POSTPROCEDURE EVALUATION
Post-Op Assessment Note    CV Status:  Stable  Pain Score: 0    Pain management: adequate       Mental Status:  Awake, arousable and sleepy   Hydration Status:  Euvolemic   PONV Controlled:  Controlled   Airway Patency:  Patent  Two or more mitigation strategies used for obstructive sleep apnea   Post Op Vitals Reviewed: Yes    No anethesia notable event occurred.    Staff: CRNA, Anesthesiologist           Last Filed PACU Vitals:  Vitals Value Taken Time   Temp 97    Pulse 66    /63    Resp 16    SpO2 99

## 2025-07-03 NOTE — INTERVAL H&P NOTE
H&P reviewed. After examining the patient I find no changes in the patients condition since the H&P had been written.    Vitals:    07/03/25 1102   BP: 115/69   Pulse: 75   Resp: 16   Temp: 97.5 °F (36.4 °C)   SpO2: 97%   Procedure described and risks again discussed. He has signed a consent. Informed consent provided.

## 2025-07-03 NOTE — OP NOTE
OPERATIVE REPORT  PATIENT NAME: Harjit Lemus    :  1985  MRN: [id number]  Pt Location: [facility] OR ROOM 06    SURGERY DATE: 7/3/2025    Surgeons and Role:     * Tony Alcantar MD - Primary    Preop Diagnosis:  Phimosis [N47.1]    Post-Op Diagnosis Codes:     * Phimosis [N47.1]    Procedure(s):  CIRCUMCISION ADULT    Specimen(s):  ID Type Source Tests Collected by Time Destination   1 :  Tissue Foreskin TISSUE EXAM Tony Alcantar MD 7/3/2025 1414        Estimated Blood Loss:   5 mL    Drains:  * No LDAs found *    Anesthesia Type:   General with local    Operative Indications:  Phimosis [N47.1]      Operative Findings:  Phimosis       Complications:   None    Procedure and Technique:  The patient was brought to the operating properly identified.  General anesthesia was administered.  He was left supine on the operating table and prepped and draped for circumcision in the usual sterile fashion.  Compression boots were employed.  Intravenous antibiotic was administered by anesthesia.  An appropriate timeout was performed.    The level of the corona was marked on the outer foreskin with a marking pen.  The ventral midline was also marked .  Local anesthesia in the form of 2% Xylocaine and 0.5% Marcaine in a 50-50 mixture was then administered circumferentially around the base of the penis to affect a  penile block.    The outer foreskin was then incised circumferentially down to the level of Rosado's fascia.  The foreskin was retracted with difficulty in the inner foreskin and then incised circumferentially around the penis approximately 1 cm proximal to the corona.  Hemostasis was achieved.  The redundant foreskin was excised.  2-0 chromic was then utilized to reapproximate the incision at the 12, 3, 6 and 9:00 positions.  The individual quadrants were then reapproximated with interrupted 2-0 chromic suture.  Several 3-0 interrupted chromic sutures were utilized to reapproximate the frenulum.  A cosmetic  circumcision was thus performed.  The patient tolerated the procedure well.  Neuroform, 4 x 4's and Coban were utilized to make a sterile pressure dressing.  The patient tolerated the procedure well.  He was awakened from anesthesia and taken to the recovery room in satisfactory condition.  I was present for the entire procedure.    Patient Disposition:  PACU  and hemodynamically stable         SIGNATURE: Tony Alcantar MD  DATE: July 3, 2025  TIME: 3:21 PM

## 2025-07-07 ENCOUNTER — TELEPHONE (OUTPATIENT)
Age: 40
End: 2025-07-07

## 2025-07-07 ENCOUNTER — OFFICE VISIT (OUTPATIENT)
Dept: UROLOGY | Facility: MEDICAL CENTER | Age: 40
End: 2025-07-07

## 2025-07-07 VITALS
BODY MASS INDEX: 30.71 KG/M2 | WEIGHT: 214 LBS | SYSTOLIC BLOOD PRESSURE: 134 MMHG | OXYGEN SATURATION: 99 % | HEART RATE: 92 BPM | DIASTOLIC BLOOD PRESSURE: 92 MMHG

## 2025-07-07 DIAGNOSIS — Z48.00 CHANGE OR REMOVAL OF WOUND DRESSING: Primary | ICD-10-CM

## 2025-07-07 PROCEDURE — 99024 POSTOP FOLLOW-UP VISIT: CPT

## 2025-07-07 NOTE — ANESTHESIA POSTPROCEDURE EVALUATION
Post-Op Assessment Note    CV Status:  Stable  Pain Score: 0    Pain management: adequate       Mental Status:  Alert   Hydration Status:  Stable   PONV Controlled:  None   Airway Patency:  Patent     Post Op Vitals Reviewed: Yes    No anethesia notable event occurred.    Staff: Anesthesiologist, CRNA           Last Filed PACU Vitals:  Vitals Value Taken Time   Temp 97.8 °F (36.6 °C) 07/03/25 15:07   Pulse 74 07/03/25 15:44   /69 07/03/25 15:37   Resp 16 07/03/25 15:37   SpO2 99 % 07/03/25 15:44   Vitals shown include unfiled device data.    Modified Ki:     Vitals Value Taken Time   Activity 2 07/03/25 15:37   Respiration 2 07/03/25 15:37   Circulation 2 07/03/25 15:37   Consciousness 2 07/03/25 15:37   Oxygen Saturation 2 07/03/25 15:37     Modified Ki Score: 10

## 2025-07-07 NOTE — TELEPHONE ENCOUNTER
State his bandage is feeling tight, informed him to remove  the bandage, he state that he is having difficult removing  due hardness to it. Advised to get in the shower and allow water to fall on it until the bandage softens and easily able to remove    He will do that and call back if any other issues or concerns.    Confirmed post op appt on 7/17/25

## 2025-07-07 NOTE — PROGRESS NOTES
Name: Harjit Lemus      : 1985      MRN: [id number]  Encounter Provider: SHANE Lagos  Encounter Date: 2025   Encounter department: [facility] [place]  :  Assessment & Plan  Change or removal of wound dressing  Patient was to remove his post-op dressing but had difficulty doing so and did not feel comfortable   Surgical dressing removed in office today   Recommend ice on/off, tylenol/motrin for pain   Apply bacitracin ointment to incision site   Advised that swelling will start to resolve and stitches may take several weeks to dissolve   Follow up at next scheduled appt on 25         History of Present Illness   Harjit Lemus is a 39 y.o. male who presents for dressing removal s/p circumcision on 7/3/25 by Dr. Alcantar. He has a history of phimosis for which a circumcision was recommended. He was to remove his post-op dressing at home but reports that it is extremely painful and he was unable to do so.      Review of Systems   Constitutional:  Negative for chills, diaphoresis, fatigue and fever.   Respiratory:  Negative for shortness of breath.    Gastrointestinal:  Negative for abdominal pain.   Genitourinary:  Positive for penile pain. Negative for hematuria.   Neurological:  Negative for dizziness and light-headedness.       Pertinent Medical History     Medical History Reviewed by provider this encounter:     .  Past Medical History   Past Medical History[1]  Past Surgical History[2]  Family History[3]   reports that he has been smoking cigarettes. He started smoking about 12 years ago. He has a 12.5 pack-year smoking history. He uses smokeless tobacco. He reports that he does not drink alcohol and does not use drugs.  Current Outpatient Medications   Medication Instructions    B-12 1,000 mcg, Sublingual, Daily  (0200)    bacitracin topical ointment 500 units/g topical ointment 1 large application, Topical, 2 times daily    ergocalciferol (VITAMIN D2) 50,000 Units,  Oral, Weekly    ergocalciferol (VITAMIN D2) 50,000 Units, Oral, Weekly    ibuprofen (MOTRIN) 400 mg, Oral, 3 times daily with meals    ketorolac (TORADOL) 10 mg, Oral, Every 6 hours PRN   Allergies[4]   Medications Ordered Prior to Encounter[5]   Social History[6]     Objective   /92 (BP Location: Left arm, Patient Position: Sitting, Cuff Size: Standard)   Pulse 92   Wt 97.1 kg (214 lb)   SpO2 99%   BMI 30.71 kg/m²     Physical Exam  Constitutional:       Appearance: Normal appearance.   HENT:      Head: Normocephalic and atraumatic.     Eyes:      Conjunctiva/sclera: Conjunctivae normal.     Pulmonary:      Effort: Pulmonary effort is normal. No respiratory distress.   Genitourinary:     Comments: Edematous penile shaft. Stitches intact to coronal sulcus, no signs of infection    Musculoskeletal:         General: Normal range of motion.      Cervical back: Normal range of motion.     Skin:     General: Skin is warm and dry.     Neurological:      General: No focal deficit present.      Mental Status: He is alert and oriented to person, place, and time.     Psychiatric:         Mood and Affect: Mood normal.         Behavior: Behavior normal.          Results   Lab Results   Component Value Date    CALCIUM 9.1 06/18/2025    K 4.4 06/18/2025    CO2 29 06/18/2025     06/18/2025    BUN 16 06/18/2025    CREATININE 0.97 06/18/2025     Lab Results   Component Value Date    WBC 6.69 06/18/2025    HGB 15.1 06/18/2025    HCT 49.2 06/18/2025    MCV 88 06/18/2025     06/18/2025          [1]   Past Medical History:  Diagnosis Date    No pertinent past medical history    [2]   Past Surgical History:  Procedure Laterality Date    EYE SURGERY      6/25/25 per pt reports eye surgery - unable to report laterality    NH CIRCUMCISION AGE >28 DAYS N/A 7/3/2025    Procedure: CIRCUMCISION ADULT;  Surgeon: Tony Alcantar MD;  Location: [facility];  Service: Urology   [3]   Family History  Problem Relation Name Age  of Onset    Anesthesia problems Neg Hx     [4] No Known Allergies  [5]   Current Outpatient Medications on File Prior to Visit   Medication Sig Dispense Refill    bacitracin topical ointment 500 units/g topical ointment Apply 1 large application topically 2 (two) times a day Do not start before July 5, 2025. 28 g 1    ergocalciferol (VITAMIN D2) 50,000 units Take 1 capsule (50,000 Units total) by mouth once a week 12 capsule 2    ketorolac (TORADOL) 10 mg tablet Take 1 tablet (10 mg total) by mouth every 6 (six) hours as needed for moderate pain for up to 5 days 20 tablet 0    Cyanocobalamin (B-12) 1000 MCG SUBL Place 1 tablet (1,000 mcg total) under the tongue Daily at 2am (Patient not taking: Reported on 7/7/2025) 90 tablet 0    ergocalciferol (VITAMIN D2) 50,000 units Take 1 capsule (50,000 Units total) by mouth once a week (Patient not taking: Reported on 7/7/2025) 12 capsule 1    ibuprofen (MOTRIN) 400 mg tablet Take 1 tablet (400 mg total) by mouth 3 (three) times a day with meals for 10 days 30 tablet 0     No current facility-administered medications on file prior to visit.   [6]   Social History  Tobacco Use    Smoking status: Every Day     Current packs/day: 1.00     Average packs/day: 1 pack/day for 12.5 years (12.5 ttl pk-yrs)     Types: Cigarettes     Start date: 2013    Smokeless tobacco: Current    Tobacco comments:     Smoking currently daily  - 1/2 -1 pack/day    Vaping Use    Vaping status: Never Used   Substance and Sexual Activity    Alcohol use: No     Comment: Denies any alcohol use per pt    Drug use: No     Comment: Denies any drug use per pt    Sexual activity: Not Currently     Comment: Not currently active at this time per pt

## 2025-07-07 NOTE — TELEPHONE ENCOUNTER
"Post Op Note    Harjit Lemus is a 39 y.o. male s/p CIRCUMCISION ADULT (Penis)  performed 7-3-2025.  Harjit Lemus is a patient of Dr. Dr. Alcantar and is seen at the [place] office.     How would you rate your pain on a scale from 1 to 10, 10 being the worst pain ever? 0  Have you had a fever? No  Have your bowel movements been regular? Yes  Do you have any difficulty urinating? No  If the patient has an incision- do you have any redness around the incision or any drainage from the incision? No    Do you have any other questions or concerns that I can address at this time? Called pt and spoke with him. Pt states that he tried to remove the bandage over the weekend and was unable to do so. While on the phone with pt, tried to assist with removal. Pt states he is \"very nervous and I don't want to mess anything up\". Pt asked if he can come into the office for assistance with bandage removal and wound check to make sure everything is healing ok.   Offered pt an appointment for 3pm today with the SHANE.   Pt confirmed this appointment.      "

## 2025-07-11 PROCEDURE — 88304 TISSUE EXAM BY PATHOLOGIST: CPT | Performed by: STUDENT IN AN ORGANIZED HEALTH CARE EDUCATION/TRAINING PROGRAM

## 2025-07-11 NOTE — PROGRESS NOTES
"Name: Harjit Lemus      : 1985      MRN: [id number]  Encounter Provider: SHANE Alejandro  Encounter Date: 2025   Encounter department: [facility]OLOG[facility] [place]  :  Assessment & Plan  Follow-up after circumcision  Site has dried blood around sulcus.  Site showed no signs of infection   Discussed cleaning the area with a little soap and water- pat area dry  Discussed no sexual activity for 8 weeks   Discussed to call office if experiencing:  Fever, chills, swelling, redness, warmth around the wound, too much pain when touched, yellowish, greenish, or bloody discharge, foul smell coming from the cut site, cut site opens up, problems passing urine, severe pain or bleeding at the cut site.  Follow up in 1 week   Dr. Wei and present in room for evaluation.Photo below           History of Present Illness   Harjit Lemus is a 40 y.o. male who presents S/p Circumcision on 7/3/2025 by Dr. Alcantar for phimosis patient denies any issues with urination.  Patient reports pain when getting an erection..        Review of Systems       Objective   /80 (BP Location: Left arm, Patient Position: Sitting, Cuff Size: Standard)   Pulse 104   Ht 5' 10\" (1.778 m)   Wt 97.1 kg (214 lb)   SpO2 98%   BMI 30.71 kg/m²     Physical Exam  Vitals and nursing note reviewed.   Constitutional:       General: He is not in acute distress.     Appearance: He is well-developed.   HENT:      Head: Normocephalic and atraumatic.     Eyes:      Conjunctiva/sclera: Conjunctivae normal.       Cardiovascular:      Rate and Rhythm: Normal rate and regular rhythm.      Heart sounds: No murmur heard.  Pulmonary:      Effort: Pulmonary effort is normal. No respiratory distress.      Breath sounds: Normal breath sounds.   Abdominal:      Palpations: Abdomen is soft.      Tenderness: There is no abdominal tenderness.   Genitourinary:     Comments: Photo below    Musculoskeletal:         General: No swelling.      " "Cervical back: Neck supple.     Skin:     General: Skin is warm and dry.      Capillary Refill: Capillary refill takes less than 2 seconds.     Neurological:      General: No focal deficit present.      Mental Status: He is alert and oriented to person, place, and time.     Psychiatric:         Mood and Affect: Mood normal.           Results   No results found for: \"PSA\"  Lab Results   Component Value Date    CALCIUM 9.1 06/18/2025    K 4.4 06/18/2025    CO2 29 06/18/2025     06/18/2025    BUN 16 06/18/2025    CREATININE 0.97 06/18/2025     Lab Results   Component Value Date    WBC 6.69 06/18/2025    HGB 15.1 06/18/2025    HCT 49.2 06/18/2025    MCV 88 06/18/2025     06/18/2025       Office Urine Dip  No results found for this or any previous visit (from the past hour).        "

## 2025-07-17 ENCOUNTER — OFFICE VISIT (OUTPATIENT)
Dept: UROLOGY | Facility: MEDICAL CENTER | Age: 40
End: 2025-07-17
Payer: MEDICARE

## 2025-07-17 VITALS
SYSTOLIC BLOOD PRESSURE: 118 MMHG | HEIGHT: 70 IN | OXYGEN SATURATION: 98 % | WEIGHT: 214 LBS | BODY MASS INDEX: 30.64 KG/M2 | HEART RATE: 104 BPM | DIASTOLIC BLOOD PRESSURE: 80 MMHG

## 2025-07-17 DIAGNOSIS — Z09 FOLLOW-UP AFTER CIRCUMCISION: Primary | ICD-10-CM

## 2025-07-17 PROCEDURE — 99214 OFFICE O/P EST MOD 30 MIN: CPT

## 2025-07-18 NOTE — PROGRESS NOTES
"Name: Harjit Lemus      : 1985      MRN: [id number]  Encounter Provider: SHANE Alejandro  Encounter Date: 2025   [facility]  :  Assessment & Plan  Follow-up after circumcision  Site has minimal blood around sulcus.  Site showed no signs of infection   Discussed cleaning the area with a little soap and water- pat area dry  Discussed no sexual activity for 8 weeks   Discussed to call office if experiencing:  Fever, chills, swelling, redness, warmth around the wound, too much pain when touched, yellowish, greenish, or bloody discharge, foul smell coming from the cut site, cut site opens up, problems passing urine, severe pain or bleeding at the cut site.  Follow up in 2 weeks  Dr. Alcantar and present in room for evaluation.Photo below  Refilled bacitracin  Orders:    bacitracin topical ointment 500 units/g topical ointment; Apply 1 large application topically 2 (two) times a day        History of Present Illness   Harjit Lemus is a 40 y.o. male who presents S/p Circumcision on 7/3/2025 by Dr. Alcantar for phimosis patient denies any issues with urination.  Patient reports pain  is getting better. He reports not issues with urination. He reports stream is straight. Denies fever, chills, heamturia, dysuria, frequency, urgency.     Review of Systems       Objective   /82 (BP Location: Left arm, Patient Position: Sitting, Cuff Size: Adult)   Pulse 97   Ht 5' 10\" (1.778 m)   Wt 96.2 kg (212 lb)   SpO2 97%   BMI 30.42 kg/m²     Physical Exam  Vitals and nursing note reviewed.   Constitutional:       General: He is not in acute distress.     Appearance: He is well-developed.   HENT:      Head: Normocephalic and atraumatic.     Eyes:      Conjunctiva/sclera: Conjunctivae normal.       Cardiovascular:      Rate and Rhythm: Normal rate and regular rhythm.      Heart sounds: No murmur heard.  Pulmonary:      Effort: Pulmonary effort is normal. No " "respiratory distress.      Breath sounds: Normal breath sounds.   Abdominal:      Palpations: Abdomen is soft.      Tenderness: There is no abdominal tenderness.   Genitourinary:     Comments: See photo below      Musculoskeletal:         General: No swelling.      Cervical back: Neck supple.     Skin:     General: Skin is warm and dry.      Capillary Refill: Capillary refill takes less than 2 seconds.     Neurological:      General: No focal deficit present.      Mental Status: He is alert and oriented to person, place, and time.     Psychiatric:         Mood and Affect: Mood normal.           Results   No results found for: \"PSA\"  Lab Results   Component Value Date    CALCIUM 9.1 06/18/2025    K 4.4 06/18/2025    CO2 29 06/18/2025     06/18/2025    BUN 16 06/18/2025    CREATININE 0.97 06/18/2025     Lab Results   Component Value Date    WBC 6.69 06/18/2025    HGB 15.1 06/18/2025    HCT 49.2 06/18/2025    MCV 88 06/18/2025     06/18/2025       Office Urine Dip  No results found for this or any previous visit (from the past hour).        "

## 2025-07-22 ENCOUNTER — OFFICE VISIT (OUTPATIENT)
Dept: UROLOGY | Facility: MEDICAL CENTER | Age: 40
End: 2025-07-22

## 2025-07-22 VITALS
SYSTOLIC BLOOD PRESSURE: 138 MMHG | WEIGHT: 212 LBS | OXYGEN SATURATION: 97 % | HEIGHT: 70 IN | BODY MASS INDEX: 30.35 KG/M2 | DIASTOLIC BLOOD PRESSURE: 82 MMHG | HEART RATE: 97 BPM

## 2025-07-22 DIAGNOSIS — Z09 FOLLOW-UP AFTER CIRCUMCISION: Primary | ICD-10-CM

## 2025-07-22 PROCEDURE — 99024 POSTOP FOLLOW-UP VISIT: CPT

## 2025-07-22 RX ORDER — GINSENG 100 MG
1 CAPSULE ORAL 2 TIMES DAILY
Qty: 28 G | Refills: 1 | Status: SHIPPED | OUTPATIENT
Start: 2025-07-22

## 2025-07-22 NOTE — ASSESSMENT & PLAN NOTE
Orders:    bacitracin topical ointment 500 units/g topical ointment; Apply 1 large application topically 2 (two) times a day

## 2025-08-06 ENCOUNTER — OFFICE VISIT (OUTPATIENT)
Dept: UROLOGY | Facility: MEDICAL CENTER | Age: 40
End: 2025-08-06

## 2025-08-06 VITALS
OXYGEN SATURATION: 98 % | HEIGHT: 70 IN | BODY MASS INDEX: 30.35 KG/M2 | WEIGHT: 212 LBS | SYSTOLIC BLOOD PRESSURE: 112 MMHG | DIASTOLIC BLOOD PRESSURE: 70 MMHG | HEART RATE: 88 BPM

## 2025-08-06 DIAGNOSIS — Z09 FOLLOW-UP AFTER CIRCUMCISION: Primary | ICD-10-CM

## 2025-08-06 RX ORDER — GINSENG 100 MG
1 CAPSULE ORAL 2 TIMES DAILY
Qty: 28 G | Refills: 3 | Status: SHIPPED | OUTPATIENT
Start: 2025-08-06

## 2025-08-20 ENCOUNTER — OFFICE VISIT (OUTPATIENT)
Dept: UROLOGY | Facility: MEDICAL CENTER | Age: 40
End: 2025-08-20
Payer: MEDICARE

## 2025-08-20 VITALS
WEIGHT: 212 LBS | OXYGEN SATURATION: 99 % | BODY MASS INDEX: 30.35 KG/M2 | DIASTOLIC BLOOD PRESSURE: 88 MMHG | HEART RATE: 72 BPM | HEIGHT: 70 IN | SYSTOLIC BLOOD PRESSURE: 126 MMHG

## 2025-08-20 DIAGNOSIS — Z09 FOLLOW-UP AFTER CIRCUMCISION: Primary | ICD-10-CM

## 2025-08-20 PROCEDURE — 99213 OFFICE O/P EST LOW 20 MIN: CPT

## (undated) DEVICE — Device

## (undated) DEVICE — CAUTERY TIP POLISHER: Brand: DEVON

## (undated) DEVICE — WET SKIN PREP TRAY: Brand: MEDLINE INDUSTRIES, INC.

## (undated) DEVICE — OCCLUSIVE GAUZE STRIP,3% BISMUTH TRIBROMOPHENATE IN PETROLATUM BLEND: Brand: XEROFORM

## (undated) DEVICE — KENDALL SCD SEQUENTIAL COMPRESSION COMFORT SLEEVES, KNEE LENGTH, MEDIUM: Brand: KENDALL SCD

## (undated) DEVICE — BETHLEHEM UNIVERSAL MINOR GEN: Brand: CARDINAL HEALTH

## (undated) DEVICE — SUT CHROMIC 3-0 SH 27 IN G122H